# Patient Record
Sex: FEMALE | Race: WHITE | NOT HISPANIC OR LATINO | Employment: FULL TIME | URBAN - METROPOLITAN AREA
[De-identification: names, ages, dates, MRNs, and addresses within clinical notes are randomized per-mention and may not be internally consistent; named-entity substitution may affect disease eponyms.]

---

## 2020-12-07 ENCOUNTER — HOSPITAL ENCOUNTER (EMERGENCY)
Facility: HOSPITAL | Age: 40
Discharge: HOME | End: 2020-12-07
Admitting: EMERGENCY MEDICINE

## 2020-12-07 VITALS
BODY MASS INDEX: 40.12 KG/M2 | DIASTOLIC BLOOD PRESSURE: 76 MMHG | TEMPERATURE: 98.4 F | WEIGHT: 218.03 LBS | SYSTOLIC BLOOD PRESSURE: 129 MMHG | HEIGHT: 62 IN | OXYGEN SATURATION: 97 % | HEART RATE: 87 BPM | RESPIRATION RATE: 18 BRPM

## 2020-12-07 DIAGNOSIS — R68.84 PAIN IN UPPER JAW: ICD-10-CM

## 2020-12-07 DIAGNOSIS — A69.1: ICD-10-CM

## 2020-12-07 DIAGNOSIS — K04.7 DENTAL ABSCESS: Primary | ICD-10-CM

## 2020-12-07 DIAGNOSIS — K02.9 DENTAL CARIES: ICD-10-CM

## 2020-12-07 PROCEDURE — 96372 THER/PROPH/DIAG INJ SC/IM: CPT

## 2020-12-07 PROCEDURE — 99283 EMERGENCY DEPT VISIT LOW MDM: CPT

## 2020-12-07 PROCEDURE — 63710000001 ONDANSETRON ODT 4 MG TABLET DISPERSIBLE: Performed by: NURSE PRACTITIONER

## 2020-12-07 RX ORDER — CLINDAMYCIN HYDROCHLORIDE 300 MG/1
300 CAPSULE ORAL 4 TIMES DAILY
Qty: 40 CAPSULE | Refills: 0 | Status: SHIPPED | OUTPATIENT
Start: 2020-12-07 | End: 2020-12-17

## 2020-12-07 RX ORDER — ONDANSETRON 4 MG/1
4 TABLET, ORALLY DISINTEGRATING ORAL ONCE
Status: COMPLETED | OUTPATIENT
Start: 2020-12-07 | End: 2020-12-07

## 2020-12-07 RX ORDER — HYDROCODONE BITARTRATE AND ACETAMINOPHEN 5; 325 MG/1; MG/1
1 TABLET ORAL ONCE AS NEEDED
Status: DISCONTINUED | OUTPATIENT
Start: 2020-12-07 | End: 2020-12-07 | Stop reason: HOSPADM

## 2020-12-07 RX ORDER — DICLOFENAC SODIUM 75 MG/1
75 TABLET, DELAYED RELEASE ORAL 2 TIMES DAILY PRN
Qty: 20 TABLET | Refills: 0 | Status: SHIPPED | OUTPATIENT
Start: 2020-12-07

## 2020-12-07 RX ORDER — CLINDAMYCIN PHOSPHATE 150 MG/ML
600 INJECTION, SOLUTION INTRAVENOUS ONCE
Status: COMPLETED | OUTPATIENT
Start: 2020-12-07 | End: 2020-12-07

## 2020-12-07 RX ADMIN — LIDOCAINE HYDROCHLORIDE: 20 SOLUTION ORAL; TOPICAL at 21:02

## 2020-12-07 RX ADMIN — HYDROCODONE BITARTRATE AND ACETAMINOPHEN 1 TABLET: 5; 325 TABLET ORAL at 21:02

## 2020-12-07 RX ADMIN — CLINDAMYCIN 600 MG: 150 INJECTION, SOLUTION INTRAMUSCULAR; INTRAVENOUS at 21:02

## 2020-12-07 RX ADMIN — ONDANSETRON 4 MG: 4 TABLET, ORALLY DISINTEGRATING ORAL at 21:02

## 2020-12-08 NOTE — DISCHARGE INSTRUCTIONS
Take antibiotics till gone    Use diclofenac with your methadone at home for pain control    Go to [facility] to get your antibiotics filled and use the good Rx coupon

## 2020-12-08 NOTE — ED PROVIDER NOTES
"Subjective   39 y/o female presents to the ED with c/o dental pain x4 days. Pain is described as a constant \"excruciating\" sensation primarily to the L upper mouth region that radiates to her jaw and head. Pain rated a 10/10. Pt has taken 800mg IBU today with some relief.  She also uses methadone at home for chronic pain.  She also states that a cold compress to the area helps as well. She also reports a diffuse throbbing HA, L sided facial swelling, and denies CP, fever, and SOB. Pt has not seen a dentist and does not have one. She is trying to get seen at [facility].          Review of Systems   Constitutional: Positive for fever. Negative for chills.   HENT: Positive for dental problem and facial swelling. Negative for sinus pain.    Eyes: Negative for pain.   Respiratory: Negative for shortness of breath.    Cardiovascular: Negative for chest pain.   Gastrointestinal: Negative for abdominal pain, nausea and vomiting.   Skin: Negative for pallor and rash.   Allergic/Immunologic: Negative for environmental allergies.   Neurological: Positive for headaches.       History reviewed. No pertinent past medical history.    Allergies   Allergen Reactions   • Penicillins Rash       History reviewed. No pertinent surgical history.    No family history on file.    Social History     Socioeconomic History   • Marital status:      Spouse name: Not on file   • Number of children: Not on file   • Years of education: Not on file   • Highest education level: Not on file           Objective   Physical Exam  Constitutional:       Appearance: Normal appearance. She is obese.   HENT:      Head: Normocephalic and atraumatic.      Mouth/Throat:      Lips: Pink.      Mouth: Mucous membranes are moist.      Dentition: Abnormal dentition. Dental tenderness, gingival swelling, dental caries and dental abscesses present.      Tongue: No lesions.      Palate: No mass and lesions.      Pharynx: Uvula midline.      Comments: " "Swelling diffusely to the L face with mild TTP. Severe TTP to teeth #10-11. Multiple caries seen throughout mouth with necrotizing gingivitis. No significant redness. No airway compromise.   Eyes:      Extraocular Movements: Extraocular movements intact.   Neck:      Musculoskeletal: Neck supple.   Cardiovascular:      Rate and Rhythm: Normal rate and regular rhythm.      Heart sounds: Normal heart sounds.   Pulmonary:      Breath sounds: Normal breath sounds.   Abdominal:      General: There is no distension.   Musculoskeletal: Normal range of motion.   Skin:     General: Skin is warm.          Neurological:      General: No focal deficit present.      Mental Status: She is alert and oriented to person, place, and time.   Psychiatric:         Attention and Perception: Attention normal.         Mood and Affect: Mood is anxious.         Behavior: Behavior normal. Behavior is cooperative.         Procedures           ED Course      /72 (BP Location: Left arm, Patient Position: Sitting)   Pulse 91   Temp 98.1 °F (36.7 °C) (Oral)   Resp 18   Ht 157.5 cm (62\")   Wt 98.9 kg (218 lb 0.6 oz)   SpO2 98%   BMI 39.88 kg/m²   Labs Reviewed - No data to display  Medications   dental ball oral suspension with diphenhydrAMINE (has no administration in time range)   HYDROcodone-acetaminophen (NORCO) 5-325 MG per tablet 1 tablet (has no administration in time range)   ondansetron ODT (ZOFRAN-ODT) disintegrating tablet 4 mg (has no administration in time range)   clindamycin (CLEOCIN) injection 600 mg (has no administration in time range)     No radiology results for the last day                                       MDM  Number of Diagnoses or Management Options  Dental abscess:   Dental caries:   Necrotizing gingivitis:   Pain in upper jaw:   Diagnosis management comments: Patient had above exam and was given clindamycin IM-as well as Norco and dental balls for pain control.  She was given the dental information list " and advised to see a dentist as soon as possible.  I wrote her prescription for clindamycin and gave her a good Rx coupon and advised her that she can get it filled for $13 tomorrow at [facility].  She verbalized understood the need to see a dentist as soon as possible and to return for any difficulty breathing or swallowing or unable to control her secretions.    Patient Progress  Patient progress: improved      Final diagnoses:   Dental abscess   Pain in upper jaw   Dental caries   Necrotizing gingivitis            Ramandeep Arnold, APRN  12/07/20 2100

## 2024-09-15 ENCOUNTER — HOSPITAL ENCOUNTER (EMERGENCY)
Facility: HOSPITAL | Age: 44
Discharge: HOME | End: 2024-09-15
Attending: EMERGENCY MEDICINE | Admitting: EMERGENCY MEDICINE
Payer: MEDICAID

## 2024-09-15 ENCOUNTER — APPOINTMENT (OUTPATIENT)
Dept: GENERAL RADIOLOGY | Facility: HOSPITAL | Age: 44
End: 2024-09-15
Payer: MEDICAID

## 2024-09-15 VITALS
DIASTOLIC BLOOD PRESSURE: 86 MMHG | WEIGHT: 180 LBS | OXYGEN SATURATION: 97 % | HEIGHT: 62 IN | RESPIRATION RATE: 18 BRPM | BODY MASS INDEX: 33.13 KG/M2 | SYSTOLIC BLOOD PRESSURE: 118 MMHG | HEART RATE: 86 BPM | TEMPERATURE: 98 F

## 2024-09-15 DIAGNOSIS — S43.401A SPRAIN OF RIGHT SHOULDER, UNSPECIFIED SHOULDER SPRAIN TYPE, INITIAL ENCOUNTER: Primary | ICD-10-CM

## 2024-09-15 PROCEDURE — 73030 X-RAY EXAM OF SHOULDER: CPT

## 2024-09-15 PROCEDURE — 99283 EMERGENCY DEPT VISIT LOW MDM: CPT

## 2024-09-15 RX ORDER — METAXALONE 800 MG/1
800 TABLET ORAL 3 TIMES DAILY PRN
Qty: 12 TABLET | Refills: 0 | Status: SHIPPED | OUTPATIENT
Start: 2024-09-15

## 2024-09-15 RX ORDER — TRAMADOL HYDROCHLORIDE 50 MG/1
50 TABLET ORAL EVERY 6 HOURS PRN
Qty: 12 TABLET | Refills: 0 | Status: SHIPPED | OUTPATIENT
Start: 2024-09-15

## 2024-09-15 RX ORDER — HYDROCODONE BITARTRATE AND ACETAMINOPHEN 5; 325 MG/1; MG/1
1 TABLET ORAL ONCE AS NEEDED
Status: COMPLETED | OUTPATIENT
Start: 2024-09-15 | End: 2024-09-15

## 2024-09-15 RX ORDER — PREDNISONE 20 MG/1
40 TABLET ORAL DAILY
Qty: 10 TABLET | Refills: 0 | Status: SHIPPED | OUTPATIENT
Start: 2024-09-15

## 2024-09-15 RX ADMIN — HYDROCODONE BITARTRATE AND ACETAMINOPHEN 1 TABLET: 5; 325 TABLET ORAL at 17:53

## 2024-11-14 ENCOUNTER — HOSPITAL ENCOUNTER (EMERGENCY)
Facility: HOSPITAL | Age: 44
Discharge: HOME | End: 2024-11-14
Attending: EMERGENCY MEDICINE
Payer: MEDICAID

## 2024-11-14 VITALS
WEIGHT: 230.82 LBS | BODY MASS INDEX: 42.48 KG/M2 | HEIGHT: 62 IN | RESPIRATION RATE: 16 BRPM | DIASTOLIC BLOOD PRESSURE: 79 MMHG | SYSTOLIC BLOOD PRESSURE: 145 MMHG | TEMPERATURE: 98.1 F | OXYGEN SATURATION: 100 % | HEART RATE: 77 BPM

## 2024-11-14 DIAGNOSIS — T22.211A PARTIAL THICKNESS BURN OF RIGHT FOREARM, INITIAL ENCOUNTER: ICD-10-CM

## 2024-11-14 DIAGNOSIS — T22.111A SUPERFICIAL BURN OF RIGHT FOREARM, INITIAL ENCOUNTER: Primary | ICD-10-CM

## 2024-11-14 PROCEDURE — 63710000001 ONDANSETRON ODT 4 MG TABLET DISPERSIBLE: Performed by: EMERGENCY MEDICINE

## 2024-11-14 PROCEDURE — 99283 EMERGENCY DEPT VISIT LOW MDM: CPT

## 2024-11-14 RX ORDER — SILVER SULFADIAZINE 10 MG/G
1 CREAM TOPICAL ONCE
Status: COMPLETED | OUTPATIENT
Start: 2024-11-14 | End: 2024-11-14

## 2024-11-14 RX ORDER — SILVER SULFADIAZINE 10 MG/G
1 CREAM TOPICAL DAILY
Qty: 50 G | Refills: 0 | Status: SHIPPED | OUTPATIENT
Start: 2024-11-14

## 2024-11-14 RX ORDER — ONDANSETRON 4 MG/1
4 TABLET, ORALLY DISINTEGRATING ORAL ONCE
Status: COMPLETED | OUTPATIENT
Start: 2024-11-14 | End: 2024-11-14

## 2024-11-14 RX ORDER — HYDROCODONE BITARTRATE AND ACETAMINOPHEN 5; 325 MG/1; MG/1
1 TABLET ORAL ONCE AS NEEDED
Status: DISCONTINUED | OUTPATIENT
Start: 2024-11-14 | End: 2024-11-14 | Stop reason: HOSPADM

## 2024-11-14 RX ADMIN — ONDANSETRON 4 MG: 4 TABLET, ORALLY DISINTEGRATING ORAL at 08:48

## 2024-11-14 RX ADMIN — SILVER SULFADIAZINE 1 APPLICATION: 10 CREAM TOPICAL at 08:48

## 2024-11-14 NOTE — ED PROVIDER NOTES
Subjective   History of Present Illness  Chief complaint burn    History of present illness this is a 44-year-old female who burned her forearm on a grill at work this morning complains of pain and burn to the forearm no other complaints or injury.  Tetanus is current      Review of Systems   Constitutional:  Negative for fever.   Skin:  Positive for wound.       No past medical history on file.    Allergies   Allergen Reactions    Penicillins Rash       No past surgical history on file.    No family history on file.    Social History     Socioeconomic History    Marital status:    No alcohol or drug    Prior to Admission medications    Medication Sig Start Date End Date Taking? Authorizing Provider   diclofenac (VOLTAREN) 75 MG EC tablet Take 1 tablet by mouth 2 (Two) Times a Day As Needed (pain). 12/7/20   Ramandeep Arnold APRN   metaxalone (SKELAXIN) 800 MG tablet Take 1 tablet by mouth 3 (Three) Times a Day As Needed for Muscle Spasms (And pain). 9/15/24   Nicko Newman MD   silver sulfadiazine (SILVADENE, SSD) 1 % cream Apply 1 Application topically to the appropriate area as directed Daily. Apply thin layer to burn daily.  May use up to twice daily. 11/14/24   Romeo Shah MD   traMADol (ULTRAM) 50 MG tablet Take 1 tablet by mouth Every 6 (Six) Hours As Needed for Moderate Pain or Severe Pain. 9/15/24   Nicko Newman MD   predniSONE (DELTASONE) 20 MG tablet Take 2 tablets by mouth Daily. 9/15/24 11/14/24  Nicko Newman MD   Patient is no longer on prednisone not on Silvadene    Objective   Physical Exam  Constitutional 44-year-old awake alert no acute distress triage vital signs reviewed examination the forearm patient has a first-degree burn with some areas of second-degree on the volar aspect of the forearm on the medial aspect of the elbow and a little bit to the lower bicep.  It is not circumferential does not surround the joint.  There is no wrist burn no burn on the extensor  aspect.  No axillary nodes no drainage at this time some mild tenderness but compartments are soft no pain with passive stretching or pain on portion exam good and equal radial and ulnar pulses good cap refill moves her fingers and thumb without difficulty neurovascular motor sensor intact.  It is about a 3% area.  Procedures           ED Course      No results found for this or any previous visit.  No radiology results for the last day  Medications   HYDROcodone-acetaminophen (NORCO) 5-325 MG per tablet 1 tablet (has no administration in time range)   ondansetron ODT (ZOFRAN-ODT) disintegrating tablet 4 mg (has no administration in time range)   silver sulfadiazine (SILVADENE, SSD) 1 % cream 1 Application (has no administration in time range)                   No data recorded                             Medical Decision Making  Medical decision making.  Patient had the above exam and evaluation.  Given 1 hydrocodone 1 Zofran p.o.  Had the burn cleaned and Silvadene dressing applied.  We had a long discussion about burn care and signs of infection and what to return for.  Talked about compartment syndrome elevation.  She was warned that this will probably blister and we talked about the need for further evaluation and follow-up.  I do not see any compartment syndrome or vascular injury or circumferential burn or infection currently.  She was stable to be discharged home for outpatient management and follow-up.    Problems Addressed:  Partial thickness burn of right forearm, initial encounter: complicated acute illness or injury  Superficial burn of right forearm, initial encounter: complicated acute illness or injury    Risk  Prescription drug management.        Final diagnoses:   Superficial burn of right forearm, initial encounter   Partial thickness burn of right forearm, initial encounter       ED Disposition  ED Disposition       ED Disposition   Discharge    Condition   Stable    Comment   --                PATIENT CONNECTION - [facility] [address]  [phone]  In 1 week           Medication List        New Prescriptions      silver sulfadiazine 1 % cream  Commonly known as: SILVADENE, SSD  Apply 1 Application topically to the appropriate area as directed Daily. Apply thin layer to burn daily.  May use up to twice daily.            Stop      predniSONE 20 MG tablet  Commonly known as: DELTASONE               Where to Get Your Medications        These medications were sent to [facility] [id number] - [place], [place] - [address] - [phone]  - [phone] FX  [address] [place] [address]      Phone: [phone]   silver sulfadiazine 1 % cream            Romeo Shah MD  11/14/24 0579

## 2024-11-14 NOTE — DISCHARGE INSTRUCTIONS
Elevate extremity apply thin layer of Silvadene cream daily up to twice daily with a loose dressing.  Elevate the extremity.  Keep this clean with soap and water.  Return for red streaks drainage foul odor large blisters numbness weakness to the hand or extremity altered mental status uncontrolled pain or any other new or worsening problems or concerns return immediately to the ER.  Follow-up primary care doctor 1 week for recheck

## 2024-11-14 NOTE — Clinical Note
[facility] EMERGENCY DEPARTMENT  [address] [place] [address]  Phone: [phone]    Sendy De León was seen and treated in our emergency department on 11/14/2024.  She may return to work on 11/15/2024.         Thank you for choosing [facility].    Romeo Shah MD